# Patient Record
Sex: FEMALE | Race: OTHER | HISPANIC OR LATINO
[De-identification: names, ages, dates, MRNs, and addresses within clinical notes are randomized per-mention and may not be internally consistent; named-entity substitution may affect disease eponyms.]

---

## 2021-06-18 ENCOUNTER — APPOINTMENT (OUTPATIENT)
Dept: FAMILY MEDICINE | Facility: CLINIC | Age: 21
End: 2021-06-18
Payer: MEDICAID

## 2021-06-18 VITALS
SYSTOLIC BLOOD PRESSURE: 122 MMHG | RESPIRATION RATE: 12 BRPM | OXYGEN SATURATION: 98 % | WEIGHT: 177 LBS | HEIGHT: 67 IN | DIASTOLIC BLOOD PRESSURE: 72 MMHG | HEART RATE: 78 BPM | BODY MASS INDEX: 27.78 KG/M2 | TEMPERATURE: 97 F

## 2021-06-18 DIAGNOSIS — Z78.9 OTHER SPECIFIED HEALTH STATUS: ICD-10-CM

## 2021-06-18 DIAGNOSIS — R61 GENERALIZED HYPERHIDROSIS: ICD-10-CM

## 2021-06-18 DIAGNOSIS — L30.9 DERMATITIS, UNSPECIFIED: ICD-10-CM

## 2021-06-18 DIAGNOSIS — Z82.49 FAMILY HISTORY OF ISCHEMIC HEART DISEASE AND OTHER DISEASES OF THE CIRCULATORY SYSTEM: ICD-10-CM

## 2021-06-18 DIAGNOSIS — Z83.3 FAMILY HISTORY OF DIABETES MELLITUS: ICD-10-CM

## 2021-06-18 DIAGNOSIS — Z00.00 ENCOUNTER FOR GENERAL ADULT MEDICAL EXAMINATION W/OUT ABNORMAL FINDINGS: ICD-10-CM

## 2021-06-18 PROCEDURE — 99385 PREV VISIT NEW AGE 18-39: CPT

## 2021-06-18 PROCEDURE — 99072 ADDL SUPL MATRL&STAF TM PHE: CPT

## 2021-06-18 NOTE — HISTORY OF PRESENT ILLNESS
[FreeTextEntry1] : Pt is here to establish PCP. [de-identified] : 19 y/o F with no significant past medical hx here to establish PCP and CPE.\par needs Dermatology referral for Eczema and Hyperhidrosis.\par Pt hesitant about COVID vaccine.

## 2021-06-18 NOTE — ASSESSMENT
[FreeTextEntry1] : 19 y/o F with no significant medical condition, establishing PCP, in general good health:\par \par -Lab to be done outpt fasting.\par -dermatology referral.\par -Counseling in COVID vaccine.\par -Further recommendations with lab results.

## 2021-06-18 NOTE — HEALTH RISK ASSESSMENT
[Good] : ~his/her~  mood as  good [0-5] : 0-5 [No] : No [1 or 2 (0 pts)] : 1 or 2 (0 points) [Never (0 pts)] : Never (0 points) [Yes] : In the past 12 months have you used drugs other than those required for medical reasons? Yes [No falls in past year] : Patient reported no falls in the past year [0] : 2) Feeling down, depressed, or hopeless: Not at all (0) [No Retinopathy] : No retinopathy [HIV Test offered] : HIV Test offered [With Family] : lives with family [# of Members in Household ___] :  household currently consist of [unfilled] member(s) [Student] : student [College] : College [Single] : single [Feels Safe at Home] : Feels safe at home [Fully functional (bathing, dressing, toileting, transferring, walking, feeding)] : Fully functional (bathing, dressing, toileting, transferring, walking, feeding) [Fully functional (using the telephone, shopping, preparing meals, housekeeping, doing laundry, using] : Fully functional and needs no help or supervision to perform IADLs (using the telephone, shopping, preparing meals, housekeeping, doing laundry, using transportation, managing medications and managing finances) [Reports normal functional visual acuity (ie: able to read med bottle)] : Reports normal functional visual acuity [Smoke Detector] : smoke detector [Carbon Monoxide Detector] : carbon monoxide detector [Seat Belt] :  uses seat belt [Safety elements used in home] : safety elements used in home [Sunscreen] : uses sunscreen [With Patient/Caregiver] : With Patient/Caregiver [Name: ___] : Health Care Proxy's Name: [unfilled]  [Relationship: ___] : Relationship: [unfilled] [I will adhere to the patient's wishes as expressed in the advance directive except as noted below.] : I will adhere to the patient's wishes as expressed in the advance directive except as noted below [] : No [de-identified] : Denies [de-identified] : Dermatology  [Audit-CScore] : 0 [de-identified] : Marijuana regular 4-5/week [de-identified] : Gym [de-identified] : Rice, beans, chicken, vegetables [OCZ2Surql] : 0 [LowDoseCTScan] : n/a [EyeExamDate] : 08/20 [Sexually Active] : not sexually active [High Risk Behavior] : no high risk behavior [Reports changes in hearing] : Reports no changes in hearing [Reports changes in vision] : Reports no changes in vision [Reports changes in dental health] : Reports no changes in dental health [Guns at Home] : no guns at home [Travel to Developing Areas] : does not  travel to developing areas [TB Exposure] : is not being exposed to tuberculosis [Caregiver Concerns] : does not have caregiver concerns [MammogramDate] : denies [PapSmearDate] : denies [ColonoscopyDate] : denies [BoneDensityDate] : denies [HIVDate] : 06/21 [HepatitisCDate] : 06/21 [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

## 2021-06-28 ENCOUNTER — EMERGENCY (EMERGENCY)
Facility: HOSPITAL | Age: 21
LOS: 1 days | Discharge: HOME | End: 2021-06-28
Attending: EMERGENCY MEDICINE
Payer: COMMERCIAL

## 2021-06-28 VITALS
WEIGHT: 179.9 LBS | SYSTOLIC BLOOD PRESSURE: 107 MMHG | OXYGEN SATURATION: 99 % | TEMPERATURE: 98 F | HEIGHT: 67 IN | HEART RATE: 67 BPM | DIASTOLIC BLOOD PRESSURE: 66 MMHG | RESPIRATION RATE: 16 BRPM

## 2021-06-28 LAB
ALBUMIN SERPL ELPH-MCNC: 4.7 G/DL — SIGNIFICANT CHANGE UP (ref 3.3–5.2)
ALP SERPL-CCNC: 63 U/L — SIGNIFICANT CHANGE UP (ref 40–120)
ALT FLD-CCNC: 17 U/L — SIGNIFICANT CHANGE UP
ANION GAP SERPL CALC-SCNC: 17 MMOL/L — SIGNIFICANT CHANGE UP (ref 5–17)
AST SERPL-CCNC: 20 U/L — SIGNIFICANT CHANGE UP
BILIRUB SERPL-MCNC: 0.3 MG/DL — LOW (ref 0.4–2)
BUN SERPL-MCNC: 11.5 MG/DL — SIGNIFICANT CHANGE UP (ref 8–20)
CALCIUM SERPL-MCNC: 10 MG/DL — SIGNIFICANT CHANGE UP (ref 8.6–10.2)
CHLORIDE SERPL-SCNC: 102 MMOL/L — SIGNIFICANT CHANGE UP (ref 98–107)
CO2 SERPL-SCNC: 21 MMOL/L — LOW (ref 22–29)
CREAT SERPL-MCNC: 0.64 MG/DL — SIGNIFICANT CHANGE UP (ref 0.5–1.3)
GLUCOSE SERPL-MCNC: 120 MG/DL — HIGH (ref 70–99)
HCG SERPL-ACNC: <4 MIU/ML — SIGNIFICANT CHANGE UP
HCT VFR BLD CALC: 42.1 % — SIGNIFICANT CHANGE UP (ref 34.5–45)
HGB BLD-MCNC: 14.1 G/DL — SIGNIFICANT CHANGE UP (ref 11.5–15.5)
MCHC RBC-ENTMCNC: 29.3 PG — SIGNIFICANT CHANGE UP (ref 27–34)
MCHC RBC-ENTMCNC: 33.5 GM/DL — SIGNIFICANT CHANGE UP (ref 32–36)
MCV RBC AUTO: 87.5 FL — SIGNIFICANT CHANGE UP (ref 80–100)
PLATELET # BLD AUTO: 353 K/UL — SIGNIFICANT CHANGE UP (ref 150–400)
POTASSIUM SERPL-MCNC: 4.1 MMOL/L — SIGNIFICANT CHANGE UP (ref 3.5–5.3)
POTASSIUM SERPL-SCNC: 4.1 MMOL/L — SIGNIFICANT CHANGE UP (ref 3.5–5.3)
PROT SERPL-MCNC: 7.9 G/DL — SIGNIFICANT CHANGE UP (ref 6.6–8.7)
RBC # BLD: 4.81 M/UL — SIGNIFICANT CHANGE UP (ref 3.8–5.2)
RBC # FLD: 12.8 % — SIGNIFICANT CHANGE UP (ref 10.3–14.5)
SODIUM SERPL-SCNC: 140 MMOL/L — SIGNIFICANT CHANGE UP (ref 135–145)
WBC # BLD: 18.76 K/UL — HIGH (ref 3.8–10.5)
WBC # FLD AUTO: 18.76 K/UL — HIGH (ref 3.8–10.5)

## 2021-06-28 PROCEDURE — 96374 THER/PROPH/DIAG INJ IV PUSH: CPT

## 2021-06-28 PROCEDURE — 36415 COLL VENOUS BLD VENIPUNCTURE: CPT

## 2021-06-28 PROCEDURE — 70450 CT HEAD/BRAIN W/O DYE: CPT

## 2021-06-28 PROCEDURE — 80053 COMPREHEN METABOLIC PANEL: CPT

## 2021-06-28 PROCEDURE — 85027 COMPLETE CBC AUTOMATED: CPT

## 2021-06-28 PROCEDURE — 99284 EMERGENCY DEPT VISIT MOD MDM: CPT | Mod: 25

## 2021-06-28 PROCEDURE — 96375 TX/PRO/DX INJ NEW DRUG ADDON: CPT

## 2021-06-28 PROCEDURE — 84702 CHORIONIC GONADOTROPIN TEST: CPT

## 2021-06-28 PROCEDURE — 99284 EMERGENCY DEPT VISIT MOD MDM: CPT

## 2021-06-28 PROCEDURE — 70450 CT HEAD/BRAIN W/O DYE: CPT | Mod: 26,MA

## 2021-06-28 RX ORDER — SODIUM CHLORIDE 9 MG/ML
1000 INJECTION, SOLUTION INTRAVENOUS ONCE
Refills: 0 | Status: COMPLETED | OUTPATIENT
Start: 2021-06-28 | End: 2021-06-28

## 2021-06-28 RX ORDER — KETOROLAC TROMETHAMINE 30 MG/ML
15 SYRINGE (ML) INJECTION ONCE
Refills: 0 | Status: DISCONTINUED | OUTPATIENT
Start: 2021-06-28 | End: 2021-06-28

## 2021-06-28 RX ORDER — METOCLOPRAMIDE HCL 10 MG
10 TABLET ORAL ONCE
Refills: 0 | Status: COMPLETED | OUTPATIENT
Start: 2021-06-28 | End: 2021-06-28

## 2021-06-28 RX ORDER — MORPHINE SULFATE 50 MG/1
2 CAPSULE, EXTENDED RELEASE ORAL ONCE
Refills: 0 | Status: DISCONTINUED | OUTPATIENT
Start: 2021-06-28 | End: 2021-06-28

## 2021-06-28 RX ORDER — ONDANSETRON 8 MG/1
4 TABLET, FILM COATED ORAL ONCE
Refills: 0 | Status: COMPLETED | OUTPATIENT
Start: 2021-06-28 | End: 2021-06-28

## 2021-06-28 RX ADMIN — Medication 10 MILLIGRAM(S): at 21:43

## 2021-06-28 RX ADMIN — Medication 15 MILLIGRAM(S): at 23:57

## 2021-06-28 RX ADMIN — SODIUM CHLORIDE 1000 MILLILITER(S): 9 INJECTION, SOLUTION INTRAVENOUS at 21:46

## 2021-06-28 RX ADMIN — MORPHINE SULFATE 2 MILLIGRAM(S): 50 CAPSULE, EXTENDED RELEASE ORAL at 21:43

## 2021-06-28 RX ADMIN — Medication 1 TABLET(S): at 23:57

## 2021-06-28 RX ADMIN — ONDANSETRON 4 MILLIGRAM(S): 8 TABLET, FILM COATED ORAL at 23:57

## 2021-06-28 NOTE — ED STATDOCS - CARE PROVIDER_API CALL
Andrew Armijo)  Neurology  [address] [place] [address], [facility] [address]  [place] [address]  Phone: [phone]  Fax: [phone]  Follow Up Time:

## 2021-06-28 NOTE — ED STATDOCS - NSFOLLOWUPINSTRUCTIONS_ED_ALL_ED_FT
General Headache Without Cause    A headache is pain or discomfort felt around the head or neck area. The specific cause of a headache may not be found. There are many causes and types of headaches. A few common ones are:  •Tension headaches.      •Migraine headaches.      •Cluster headaches.      •Chronic daily headaches.        Follow these instructions at home:    Watch your condition for any changes. Let your health care provider know about them. Take these steps to help with your condition:      Managing pain                   •Take over-the-counter and prescription medicines only as told by your health care provider.      •Lie down in a dark, quiet room when you have a headache.    •If directed, put ice on your head and neck area:  •Put ice in a plastic bag.      •Place a towel between your skin and the bag.      •Leave the ice on for 20 minutes, 2–3 times per day.      •If directed, apply heat to the affected area. Use the heat source that your health care provider recommends, such as a moist heat pack or a heating pad.  •Place a towel between your skin and the heat source.      •Leave the heat on for 20–30 minutes.      •Remove the heat if your skin turns bright red. This is especially important if you are unable to feel pain, heat, or cold. You may have a greater risk of getting burned.        •Keep lights dim if bright lights bother you or make your headaches worse.      Eating and drinking     •Eat meals on a regular schedule.    •If you drink alcohol:•Limit how much you use to:   •0–1 drink a day for women.       • 0–2 drinks a day for men.         •Be aware of how much alcohol is in your drink. In the [place] one drink equals one 12 oz bottle of beer (355 mL), one 5 oz glass of wine (148 mL), or one 1½ oz glass of hard liquor (44 mL).        •Stop drinking caffeine, or decrease the amount of caffeine you drink.        General instructions    •Keep a headache journal to help find out what may trigger your headaches. For example, write down:  •What you eat and drink.      •How much sleep you get.      •Any change to your diet or medicines.        •Try massage or other relaxation techniques.      •Limit stress.      •Sit up straight, and do not tense your muscles.      • Do not use any products that contain nicotine or tobacco, such as cigarettes, e-cigarettes, and chewing tobacco. If you need help quitting, ask your health care provider.      •Exercise regularly as told by your health care provider.      •Sleep on a regular schedule. Get 7–9 hours of sleep each night, or the amount recommended by your health care provider.      •Keep all follow-up visits as told by your health care provider. This is important.        Contact a health care provider if:    •Your symptoms are not helped by medicine.      •You have a headache that is different from the usual headache.      •You have nausea or you vomit.      •You have a fever.        Get help right away if:    •Your headache becomes severe quickly.      •Your headache gets worse after moderate to intense physical activity.      •You have repeated vomiting.      •You have a stiff neck.      •You have a loss of vision.      •You have problems with speech.      •You have pain in the eye or ear.      •You have muscular weakness or loss of muscle control.      •You lose your balance or have trouble walking.      •You feel faint or pass out.      •You have confusion.      •You have a seizure.        Summary    •A headache is pain or discomfort felt around the head or neck area.      •There are many causes and types of headaches. In some cases, the cause may not be found.      •Keep a headache journal to help find out what may trigger your headaches. Watch your condition for any changes. Let your health care provider know about them.      •Contact a health care provider if you have a headache that is different from the usual headache, or if your symptoms are not helped by medicine.      •Get help right away if your headache becomes severe, you vomit, you have a loss of vision, you lose your balance, or you have a seizure.      This information is not intended to replace advice given to you by your health care provider. Make sure you discuss any questions you have with your health care provider.

## 2021-06-28 NOTE — ED STATDOCS - NS_ ATTENDINGSCRIBEDETAILS _ED_A_ED_FT
I, Keaton Beck, performed the initial face to face bedside interview with this patient regarding history of present illness, review of symptoms and relevant past medical, social and family history.  I completed an independent physical examination.  I was the initial provider who evaluated this patient. I have signed out the follow up of any pending tests (i.e. labs, radiological studies) to the ACP.  I have communicated the patient’s plan of care and disposition with the ACP.  The history, relevant review of systems, past medical and surgical history, medical decision making, and physical examination was documented by the scribe in my presence and I attest to the accuracy of the documentation.

## 2021-06-28 NOTE — ED STATDOCS - OBJECTIVE STATEMENT
21 y/o female with no PMHx, presents to the ED c/o sudden onset of headache associated with nausea and vomiting that began 5 hours ago. Reports vomiting mucous. Reports numbness of fingertips and states she was seeing flashes of lights. Notes feeling sweaty upon arrival. Denies hx of migraines. Took 3 Advil today. Denies exposure to covid-19. Denies urinary symptoms. Last ate at 3pm. NKDA.

## 2021-06-28 NOTE — ED STATDOCS - PATIENT PORTAL LINK FT
You can access the FollowMyHealth Patient Portal offered by [facility] by registering at the following website: [email or web address]. By joining [facility]’s FollowMyHealth portal, you will also be able to view your health information using other applications (apps) compatible with our system.

## 2021-06-28 NOTE — ED STATDOCS - ADDITIONAL NOTES AND INSTRUCTIONS:
PT was evaluated At [facility] ED and was found to have a condition that warranted time of to rest and heal from WORK/SCHOOL.   Nithin Blel PA-C

## 2021-06-28 NOTE — ED ADULT TRIAGE NOTE - NS ED TRIAGE AVPU SCALE
Alert-The patient is alert, awake and responds to voice. The patient is oriented to time, place, and person. The triage nurse is able to obtain subjective information. Alert & oriented; no sensory, motor or coordination deficits, normal reflexes

## 2021-06-29 RX ORDER — METOCLOPRAMIDE HCL 10 MG
1 TABLET ORAL
Qty: 9 | Refills: 0
Start: 2021-06-29 | End: 2021-07-01

## 2021-06-29 RX ORDER — IBUPROFEN 200 MG
1 TABLET ORAL
Qty: 20 | Refills: 0
Start: 2021-06-29 | End: 2021-07-03

## 2021-08-02 ENCOUNTER — APPOINTMENT (OUTPATIENT)
Dept: DERMATOLOGY | Facility: CLINIC | Age: 21
End: 2021-08-02
Payer: MEDICAID

## 2021-08-02 DIAGNOSIS — Z91.89 OTHER SPECIFIED PERSONAL RISK FACTORS, NOT ELSEWHERE CLASSIFIED: ICD-10-CM

## 2021-08-02 PROCEDURE — 99203 OFFICE O/P NEW LOW 30 MIN: CPT

## 2021-08-02 PROCEDURE — 99072 ADDL SUPL MATRL&STAF TM PHE: CPT

## 2021-08-02 RX ORDER — IBUPROFEN 600 MG/1
600 TABLET, FILM COATED ORAL
Qty: 20 | Refills: 0 | Status: ACTIVE | COMMUNITY
Start: 2021-06-29

## 2021-08-02 NOTE — HISTORY OF PRESENT ILLNESS
[FreeTextEntry1] : Excess sweating and axilla and rash on buttocks. [de-identified] : First visit for a 20-year-old  female with a three-year history of excessive sweating in the axillae.\par Hands also with increased sweating. No previous treatment.\par Patient also complains of a long history of a rash in the buttock area. Previously diagnosed as "folliculitis".\par No previous treatment.\par \par Note: Patient also has a history of eczema which is not active currently.

## 2021-08-02 NOTE — PHYSICAL EXAM
[Alert] : alert [Oriented x 3] : ~L oriented x 3 [Well Nourished] : well nourished [FreeTextEntry3] : Type II skin\par \par Patient wearing a face mask\par \par Axillae: Very moist soaking through T-shirt\par Hands: Mild increased sweating noted\par Buttocks: Mild large papules, especially on right

## 2021-10-11 ENCOUNTER — APPOINTMENT (OUTPATIENT)
Dept: DERMATOLOGY | Facility: CLINIC | Age: 21
End: 2021-10-11
Payer: MEDICAID

## 2021-10-11 DIAGNOSIS — L73.9 FOLLICULAR DISORDER, UNSPECIFIED: ICD-10-CM

## 2021-10-11 PROCEDURE — 99213 OFFICE O/P EST LOW 20 MIN: CPT

## 2021-10-11 PROCEDURE — 99072 ADDL SUPL MATRL&STAF TM PHE: CPT

## 2021-10-11 RX ORDER — BENZOYL PEROXIDE 100 MG/ML
10 LIQUID TOPICAL
Qty: 1 | Refills: 5 | Status: ACTIVE | COMMUNITY
Start: 2021-08-02 | End: 1900-01-01

## 2021-10-11 NOTE — ASSESSMENT
[FreeTextEntry1] : Hyperhidrosis: Improved\par Folliculitis on the buttocks: Improved\par Keratosis pilaris on back\par Incipient acne vulgaris on face

## 2021-10-11 NOTE — PHYSICAL EXAM
[Alert] : alert [Oriented x 3] : ~L oriented x 3 [Well Nourished] : well nourished [FreeTextEntry3] : Face: Mild papules, especially left cheek\par Upper back: Multiple 1 mm erythematous follicular papules present, especially along the spinal column area\par Back: Mild to moderate small papules

## 2021-10-11 NOTE — HISTORY OF PRESENT ILLNESS
[FreeTextEntry1] : Excess sweating and rash on buttocks [de-identified] : Followup visit for a 20-year-old  female first seen by me on August 2, 2021, with marked hyperhidrosis in the axillae and hands. Treated with glycopyrrolate 1mg\par PO twice a with significant improvement\par Patient also has folliculitis on the buttocks. This was treated with 1% clindamycin solution b.i.d. and\par 10% benzoyl peroxide wash in the shower.\par Patient also complains of "keratosis pilaris" on the back\par \par Patient also complains of acne on the face

## 2021-10-12 ENCOUNTER — NON-APPOINTMENT (OUTPATIENT)
Age: 21
End: 2021-10-12

## 2021-10-12 RX ORDER — HYDROCORTISONE 1 %
12 CREAM (GRAM) TOPICAL
Qty: 1 | Refills: 3 | Status: ACTIVE | COMMUNITY
Start: 2021-10-11 | End: 1900-01-01

## 2022-01-13 ENCOUNTER — APPOINTMENT (OUTPATIENT)
Dept: DERMATOLOGY | Facility: CLINIC | Age: 22
End: 2022-01-13
Payer: MEDICAID

## 2022-01-13 DIAGNOSIS — L30.8 OTHER SPECIFIED DERMATITIS: ICD-10-CM

## 2022-01-13 PROCEDURE — 99072 ADDL SUPL MATRL&STAF TM PHE: CPT

## 2022-01-13 PROCEDURE — 99213 OFFICE O/P EST LOW 20 MIN: CPT

## 2022-01-13 RX ORDER — CLINDAMYCIN PHOSPHATE 10 MG/ML
1 LOTION TOPICAL
Qty: 1 | Refills: 3 | Status: ACTIVE | COMMUNITY
Start: 2021-10-11 | End: 1900-01-01

## 2022-01-13 RX ORDER — HYDROCORTISONE 25 MG/G
2.5 OINTMENT TOPICAL
Qty: 1 | Refills: 1 | Status: ACTIVE | COMMUNITY
Start: 2022-01-13 | End: 1900-01-01

## 2022-01-13 RX ORDER — TRETINOIN 0.5 MG/G
0.05 CREAM TOPICAL
Qty: 1 | Refills: 2 | Status: ACTIVE | COMMUNITY
Start: 2022-01-13 | End: 1900-01-01

## 2022-01-13 NOTE — PHYSICAL EXAM
[Alert] : alert [Oriented x 3] : ~L oriented x 3 [Well Nourished] : well nourished [FreeTextEntry3] : Face: Symmetric horizontal urticarial pink plaques present on the upper cheeks\par Rare very small papules or pustules\par Mild comedones\par Upper mid back: Multiple erythematous follicular papules

## 2022-01-13 NOTE — ASSESSMENT
[FreeTextEntry1] : Hyperhidrosis: Under very good control\par Acne on face: Under very good control \par Keratosis pilaris on back: Persistent\par ? Irritant dermatitis from facemask on upper cheeks

## 2022-01-13 NOTE — HISTORY OF PRESENT ILLNESS
[FreeTextEntry1] : Hyperhidrosis and acne [de-identified] : Followup visit for 21-year-old   female last seen by me on October 11, 2021, with marked hyperhidrosis of the axillae and hands.  Treated with glycopyrrolate 1 mg p.o. b.i.d. with good results.  Currently on 1 mg a day.\par Patient also had acne vulgaris on the face and folliculitis on the back and buttocks. This was treated with:\par Start 1% clindamycin lotion to affected areas b.i.d.\par Continue 10% benzoyl peroxide wash in shower\par \par Patient also had keratosis pilaris on the back\par This was treated with 12% ammonium lactate lotion\par \par Patient complains of "burning" in the left upper cheek

## 2022-06-01 ENCOUNTER — APPOINTMENT (OUTPATIENT)
Dept: NEUROLOGY | Facility: CLINIC | Age: 22
End: 2022-06-01
Payer: MEDICAID

## 2022-06-01 ENCOUNTER — NON-APPOINTMENT (OUTPATIENT)
Age: 22
End: 2022-06-01

## 2022-06-01 VITALS
TEMPERATURE: 98.4 F | HEIGHT: 67 IN | HEART RATE: 82 BPM | BODY MASS INDEX: 27.47 KG/M2 | OXYGEN SATURATION: 97 % | SYSTOLIC BLOOD PRESSURE: 130 MMHG | WEIGHT: 175 LBS | DIASTOLIC BLOOD PRESSURE: 79 MMHG

## 2022-06-01 DIAGNOSIS — Z82.0 FAMILY HISTORY OF EPILEPSY AND OTHER DISEASES OF THE NERVOUS SYSTEM: ICD-10-CM

## 2022-06-01 PROCEDURE — 99203 OFFICE O/P NEW LOW 30 MIN: CPT

## 2022-06-01 RX ORDER — RIMEGEPANT SULFATE 75 MG/75MG
75 TABLET, ORALLY DISINTEGRATING ORAL
Refills: 0 | Status: ACTIVE | COMMUNITY

## 2022-06-01 RX ORDER — BUTALBITAL, ACETAMINOPHEN AND CAFFEINE 300; 50; 40 MG/1; MG/1; MG/1
50-300-40 CAPSULE ORAL
Qty: 20 | Refills: 0 | Status: ACTIVE | COMMUNITY
Start: 2022-03-16

## 2022-06-01 NOTE — DISCUSSION/SUMMARY
[FreeTextEntry1] : Ms. Goins is a 21 year-old woman with PMH migraines who presented to the office today for evaluation of a bump on the right side of her head and migraines. CTH performed at [facility] in June 2021 was unremarkable. Perform MRI head to further evaluate patient's symptoms of head pain and small right-sided lump on head. Continue Nurtec 75mg PRN for migraines. Follow-up with me in one month. All of her questions and concerns were addressed.

## 2022-06-01 NOTE — HISTORY OF PRESENT ILLNESS
[FreeTextEntry1] : Ms. Goins is a 21 year-old woman with PMH migraines who presents to the office today to obtain a second opinion for painful bump on right side of head. She reports emergence of bump about a year ago and states that it has enlarged over the past few months. Palpation of or laying on the bump causes a throbbing type pain. She reports history of migraines that occur infrequently (last migraine in December/January) and cause a severe, throbbing pain throughout her entire head with associated light sensitivity, sound sensitivity and nausea. She has taken Fioricet with relief of symptoms and was recently prescribed Nurtec but has not needed to take it as of yet. CTH performed at [facility] in June 2021 was unremarkable. She has never had an MRI head performed. She reports occasional tingling sensation in palms bilateral hands at night. She denies any other new or concerning neurological symptoms. \par

## 2022-06-01 NOTE — PHYSICAL EXAM
[FreeTextEntry1] : GENERAL PHYSICAL EXAM:\par GEN: no distress, normal affect\par HEENT: NCAT, OP clear\par EYES: sclera white, conjunctiva clear, no nystagmus\par NECK: supple\par CV: normal rhythm\par PULM: no respiratory distress, normal rhythm and effort\par EXT: no edema, no cyanosis\par MSK: muscle tone and strength normal\par SKIN: warm, dry, no rash or lesion on exposed skin, firm, tender small pea sized bump on lower parietal aspect of right side head\par \par NEUROLOGICAL EXAM:\par Mental Status\par Orientation: alert and oriented to person, place, time, and situation\par Language: clear and fluent, intact comprehension and repetition\par \par Cranial Nerves\par II: visual fields full to confrontation \par III, IV, VI: PERRL, EOMI\par V, VII: facial sensation and movement intact and symmetric \par VIII: hearing intact \par IX, X: uvula midline, soft palate elevates normally \par XI: BL shoulder shrug intact \par XII: tongue midline\par \par Motor\par Shoulder abd: 5 (R), 5 (L)\par EF/EE: 5 (R), 5 (L)\par hand : 5 (R), 5 (L)\par HF/HE: 5 (R), 5 (L)\par KF/KE: 5 (R), 5 (L)\par DF/PF: 5 (R), 5 (L) \par Tone and bulk are normal in upper and lower limbs\par No pronator drift\par \par Sensation\par Intact to light touch in all 4 EXTs\par \par Reflex\par 2+ in BL biceps, brachioradialis, patella\par \par Coordination\par Normal FTN bilaterally\par Dysdiadochokinesia not present. \par Able to perform rapid, alternating movements\par \par Gait\par Normal stance, stride, and pivot turn\par Tandem walk intact

## 2022-06-01 NOTE — REVIEW OF SYSTEMS
[Numbness] : numbness [Tingling] : tingling [Migraine Headache] : migraine headaches [Confused or Disoriented] : no confusion [Memory Lapses or Loss] : no memory loss [Decr. Concentrating Ability] : no decrease in concentrating ability [Difficulty with Language] : no ~M difficulty with language [Facial Weakness] : no facial weakness [Arm Weakness] : no arm weakness [Hand Weakness] : no hand weakness [Leg Weakness] : no leg weakness [Poor Coordination] : good coordination [Seizures] : no convulsions [Dizziness] : no dizziness [Cluster Headache] : no cluster headache [Difficulty Walking] : no difficulty walking [Frequent Falls] : not falling [Anxiety] : no anxiety [Depression] : no depression [Eye Pain] : no eye pain [Eyesight Problems] : no eyesight problems [Earache] : no earache [Loss Of Hearing] : no hearing loss [Chest Pain] : no chest pain [Palpitations] : no palpitations [Shortness Of Breath] : no shortness of breath [Cough] : no cough [de-identified] : Occasional numbness/tingling on palms of bilateral hands at night

## 2022-06-13 ENCOUNTER — APPOINTMENT (OUTPATIENT)
Dept: DERMATOLOGY | Facility: CLINIC | Age: 22
End: 2022-06-13
Payer: MEDICAID

## 2022-06-13 DIAGNOSIS — L74.513 PRIMARY FOCAL HYPERHIDROSIS, PALMS: ICD-10-CM

## 2022-06-13 DIAGNOSIS — L85.8 OTHER SPECIFIED EPIDERMAL THICKENING: ICD-10-CM

## 2022-06-13 DIAGNOSIS — L74.512 PRIMARY FOCAL HYPERHIDROSIS, PALMS: ICD-10-CM

## 2022-06-13 DIAGNOSIS — L70.0 ACNE VULGARIS: ICD-10-CM

## 2022-06-13 DIAGNOSIS — B36.0 PITYRIASIS VERSICOLOR: ICD-10-CM

## 2022-06-13 PROCEDURE — 99213 OFFICE O/P EST LOW 20 MIN: CPT

## 2022-06-13 RX ORDER — CLINDAMYCIN PHOSPHATE 10 MG/ML
1 SOLUTION TOPICAL
Qty: 1 | Refills: 5 | Status: ACTIVE | COMMUNITY
Start: 2021-08-02 | End: 1900-01-01

## 2022-06-13 NOTE — PHYSICAL EXAM
[Alert] : alert [Oriented x 3] : ~L oriented x 3 [Well Nourished] : well nourished [FreeTextEntry3] : Face: Essentially no papules\par Mild comedones-especially\par Back: Multiple dull follicular erythematous papules present, especially on the upper midback\par \par right lower neck: Small brown slightly scaly patch with fewvery small tan scaly macules present Southeast of it\par KOH-positive

## 2022-06-13 NOTE — ASSESSMENT
[FreeTextEntry1] : Hyperhidrosis: Under good control\par Acne vulgaris on face: Under good control with current with comedonal component on chin\par Keratosis pilaris on back  -  persistent\par Tinea versicolor on right neck

## 2022-06-13 NOTE — HISTORY OF PRESENT ILLNESS
[FreeTextEntry1] : Hyperhidrosis and acne [de-identified] : Follow-up visit for 21-year-old  female last seen by me on January 13, 2022, with a variety of issues.\par \par Marked hyperhidrosis in the axillae and hands.  Currently treated with:\par Glycopyrrolate 1 mg p.o. once a day with good results\par \par Acne vulgaris on the face and folliculitis on the back and buttocks.  Currently treated with:\par Continue 1% clindamycin lotion to face twice daily - currently using 1% clindamycin solution\par Continue 10% benzyl peroxide wash and shower\par \par Return 6 months also complains of a rash on the right neck.  Gets larger during the summer.\par \par Keratosis pilaris on the back.  Treatment modified at the last visit to:\par Continue 12% ammonium lactate lotion to back in a.m.\par Start tretinoin cream 0.05% apply sparingly to back at night- stopped using this due to "burning" \par \par Patient also complained of "burning" diagnosed as a possible irritant reaction from the facemask.  This was treated with:\par Start 2-1/2% hydrocortisone ointment to face once or twice a day as needed.  This has resolved.

## 2022-07-05 ENCOUNTER — APPOINTMENT (OUTPATIENT)
Dept: NEUROLOGY | Facility: CLINIC | Age: 22
End: 2022-07-05

## 2022-07-05 VITALS
SYSTOLIC BLOOD PRESSURE: 112 MMHG | HEIGHT: 67 IN | TEMPERATURE: 98.5 F | WEIGHT: 175 LBS | DIASTOLIC BLOOD PRESSURE: 72 MMHG | OXYGEN SATURATION: 97 % | BODY MASS INDEX: 27.47 KG/M2 | HEART RATE: 66 BPM

## 2022-07-05 DIAGNOSIS — G43.909 MIGRAINE, UNSPECIFIED, NOT INTRACTABLE, W/OUT STATUS MIGRAINOSUS: ICD-10-CM

## 2022-07-05 PROCEDURE — 99213 OFFICE O/P EST LOW 20 MIN: CPT

## 2022-07-05 NOTE — HISTORY OF PRESENT ILLNESS
[FreeTextEntry1] : INTERIM HISTORY: Since her last visit, Ms. Goins had MRI brain performed, which was unremarkable. Bump on right side of head still present and painful with pressure or palpation. She denies migraines, headaches or any other new or concerning neurological symptoms. \par \par INITIAL OFFICE VISIT 6/1/22: Ms. Goins is a 21 year-old woman with PMH migraines who presents to the office today to obtain a second opinion for painful bump on right side of head. She reports emergence of bump about a year ago and states that it has enlarged over the past few months. Palpation of or laying on the bump causes a throbbing type pain. She reports history of migraines that occur infrequently (last migraine in December/January) and cause a severe, throbbing pain throughout her entire head with associated light sensitivity, sound sensitivity and nausea. She has taken Fioricet with relief of symptoms and was recently prescribed Nurtec but has not needed to take it as of yet. CTH performed at [facility] in June 2021 was unremarkable. She has never had an MRI head performed. She reports occasional tingling sensation in palms bilateral hands at night. She denies any other new or concerning neurological symptoms. \par

## 2022-07-05 NOTE — REVIEW OF SYSTEMS
[Numbness] : numbness [Tingling] : tingling [Migraine Headache] : migraine headaches [Confused or Disoriented] : no confusion [Memory Lapses or Loss] : no memory loss [Decr. Concentrating Ability] : no decrease in concentrating ability [Difficulty with Language] : no ~M difficulty with language [Facial Weakness] : no facial weakness [Arm Weakness] : no arm weakness [Hand Weakness] : no hand weakness [Leg Weakness] : no leg weakness [Poor Coordination] : good coordination [Seizures] : no convulsions [Dizziness] : no dizziness [Cluster Headache] : no cluster headache [Difficulty Walking] : no difficulty walking [Frequent Falls] : not falling [Anxiety] : no anxiety [Depression] : no depression [Eye Pain] : no eye pain [Eyesight Problems] : no eyesight problems [Earache] : no earache [Loss Of Hearing] : no hearing loss [Chest Pain] : no chest pain [Palpitations] : no palpitations [Shortness Of Breath] : no shortness of breath [Cough] : no cough [de-identified] : Occasional numbness/tingling on palms of bilateral hands at night

## 2022-07-05 NOTE — DISCUSSION/SUMMARY
[FreeTextEntry1] : Ms. Goins is a 21 year-old woman with PMH migraines who presented to the office today for evaluation of a bump on the right side of her head and migraines. CTH and MRI head are unremarkable. Follow-up with dermatology regarding bump on scalp, which is presumably either a sebaceous cyst or dermoid cyst. Continue Nurtec 75mg PRN for migraines. Follow-up with me PRN. All of her questions and concerns were addressed.

## 2022-08-02 ENCOUNTER — APPOINTMENT (OUTPATIENT)
Dept: DERMATOLOGY | Facility: CLINIC | Age: 22
End: 2022-08-02

## 2022-08-02 DIAGNOSIS — L72.11 PILAR CYST: ICD-10-CM

## 2022-08-02 PROCEDURE — 99212 OFFICE O/P EST SF 10 MIN: CPT

## 2022-08-02 RX ORDER — ERGOCALCIFEROL 1.25 MG/1
1.25 MG CAPSULE, LIQUID FILLED ORAL
Qty: 4 | Refills: 0 | Status: DISCONTINUED | COMMUNITY
Start: 2022-07-22

## 2022-08-02 NOTE — HISTORY OF PRESENT ILLNESS
[FreeTextEntry1] : Lump on scalp [de-identified] : Followup visit for 21-year-old  female last seen by me on June 13, 2022, presenting today a one year history of a tender lump on the right occipital scalp area. Had an MRI done by neurologist which did not reveal anything.

## 2022-08-02 NOTE — PHYSICAL EXAM
[Alert] : alert [Oriented x 3] : ~L oriented x 3 [Well Nourished] : well nourished [FreeTextEntry3] : Patient wearing a face mask \par \par Right lower parietal scalp: 6 x 6 mm skin colored freely movable palpable nodule

## 2022-08-08 ENCOUNTER — APPOINTMENT (OUTPATIENT)
Dept: DERMATOLOGY | Facility: CLINIC | Age: 22
End: 2022-08-08

## 2022-08-08 DIAGNOSIS — L72.3 SEBACEOUS CYST: ICD-10-CM

## 2022-08-08 PROCEDURE — 99214 OFFICE O/P EST MOD 30 MIN: CPT

## 2022-08-08 NOTE — ASSESSMENT
[FreeTextEntry1] : Pilar cyst;  painful at times; \par Schedule for excision; \par The risks and benefits of the procedure, were explained at length including pain; scar, bleeding; recurrence of lesion(s); need for wound care;\par  \par f/u surgery;

## 2022-08-08 NOTE — PHYSICAL EXAM
[FreeTextEntry3] : Right postauricular scalp; \par Mobile subcutaneous nodule with normal overlying skin and a connecting pore \par \par

## 2022-08-29 ENCOUNTER — APPOINTMENT (OUTPATIENT)
Dept: DERMATOLOGY | Facility: CLINIC | Age: 22
End: 2022-08-29

## 2022-08-29 DIAGNOSIS — D48.5 NEOPLASM OF UNCERTAIN BEHAVIOR OF SKIN: ICD-10-CM

## 2022-08-29 PROCEDURE — 11422 EXC H-F-NK-SP B9+MARG 1.1-2: CPT

## 2022-08-29 PROCEDURE — 12032 INTMD RPR S/A/T/EXT 2.6-7.5: CPT

## 2022-09-06 ENCOUNTER — APPOINTMENT (OUTPATIENT)
Dept: DERMATOLOGY | Facility: CLINIC | Age: 22
End: 2022-09-06

## 2022-09-06 PROCEDURE — 99024 POSTOP FOLLOW-UP VISIT: CPT

## 2022-09-13 LAB — CORE LAB BIOPSY: NORMAL

## 2022-10-13 NOTE — ED STATDOCS - PROGRESS NOTE DETAILS
Nithin Cuevas: Pt seen by intake physician and HPI/ROS/PE/plan reviewed Confirmed and adjusted were appropriate.    PE: GEN: Awake, alert,  NAD,  EYES: PERRL, EOMI,   CARDIAC: Reg rate and rhythm, S1,S2, RRR  RESP: No distress noted. Lungs CTA bilaterally no wheeze, ronchi, rales. ABD: soft,  non-tender, no guarding. . NEURO: A&O x 3, CN II-Xii GI, MAEx 4,  5/5/ motors, = sensation, no pronator drift or ataxia.    PLAN: PT with stable VS, no acute distress, non toxic appearing, tolerating PO in the ED, PT neuro vasc intact, gradual onset of HA, non exertional HA, good response to meds, Pt to be dc home with follow up to PCP, neuro, educated about when to return to the ED if needed. PT verbalizes that he understands all instructions and results. Pt informed that ED is open and available 24/7 365 days a yr, encouraged to return to the ED if they have any change in condition, or feel the need for revaluation.
CPAP

## 2023-02-02 ENCOUNTER — NON-APPOINTMENT (OUTPATIENT)
Age: 23
End: 2023-02-02

## 2023-02-02 RX ORDER — GLYCOPYRROLATE 1 MG/1
1 TABLET ORAL
Qty: 60 | Refills: 4 | Status: ACTIVE | COMMUNITY
Start: 2021-08-02 | End: 1900-01-01

## 2023-03-07 ENCOUNTER — APPOINTMENT (OUTPATIENT)
Dept: DERMATOLOGY | Facility: CLINIC | Age: 23
End: 2023-03-07

## 2023-03-07 ENCOUNTER — NON-APPOINTMENT (OUTPATIENT)
Age: 23
End: 2023-03-07

## 2023-03-07 RX ORDER — GLYCOPYRROLATE 2 MG/1
2 TABLET ORAL DAILY
Qty: 90 | Refills: 1 | Status: ACTIVE | COMMUNITY
Start: 2023-03-07 | End: 1900-01-01

## 2023-04-24 ENCOUNTER — NON-APPOINTMENT (OUTPATIENT)
Age: 23
End: 2023-04-24

## 2023-06-07 ENCOUNTER — EMERGENCY (EMERGENCY)
Facility: HOSPITAL | Age: 23
LOS: 1 days | Discharge: HOME | End: 2023-06-07
Attending: EMERGENCY MEDICINE
Payer: COMMERCIAL

## 2023-06-07 VITALS
HEART RATE: 83 BPM | WEIGHT: 165.79 LBS | OXYGEN SATURATION: 98 % | SYSTOLIC BLOOD PRESSURE: 121 MMHG | RESPIRATION RATE: 19 BRPM | TEMPERATURE: 99 F | DIASTOLIC BLOOD PRESSURE: 78 MMHG

## 2023-06-07 PROCEDURE — 10060 I&D ABSCESS SIMPLE/SINGLE: CPT

## 2023-06-07 PROCEDURE — 82962 GLUCOSE BLOOD TEST: CPT

## 2023-06-07 PROCEDURE — 99284 EMERGENCY DEPT VISIT MOD MDM: CPT | Mod: 25

## 2023-06-07 PROCEDURE — 99282 EMERGENCY DEPT VISIT SF MDM: CPT

## 2023-06-07 RX ORDER — CEPHALEXIN 500 MG
1 CAPSULE ORAL
Qty: 28 | Refills: 0
Start: 2023-06-07 | End: 2023-06-13

## 2023-06-07 NOTE — ED ADULT NURSE NOTE - OBJECTIVE STATEMENT
pt a&ox3, vss, c/o abscess on L buttock x2 weeks, pt denies fevers or bleeding from site. respirations even and unlabored. POC discussed w. patient will continue to reassess

## 2023-06-07 NOTE — ED PROVIDER NOTE - PATIENT PORTAL LINK FT
You can access the FollowMyHealth Patient Portal offered by [facility] by registering at the following website: [email or web address]. By joining [facility]’s FollowMyHealth portal, you will also be able to view your health information using other applications (apps) compatible with our system.

## 2023-06-07 NOTE — ED ADULT NURSE NOTE - NSFALLUNIVINTERV_ED_ALL_ED
Bed/Stretcher in lowest position, wheels locked, appropriate side rails in place/Call bell, personal items and telephone in reach/Instruct patient to call for assistance before getting out of bed/chair/stretcher/Non-slip footwear applied when patient is off stretcher/[place] to call system/Physically safe environment - no spills, clutter or unnecessary equipment/Purposeful proactive rounding/Room/bathroom lighting operational, light cord in reach

## 2023-06-07 NOTE — ED PROVIDER NOTE - NS ED ATTENDING STATEMENT MOD
This was a shared visit with the ASHA. I reviewed and verified the documentation and independently performed the documented:

## 2023-06-07 NOTE — ED PROCEDURE NOTE - NS ED ATTENDING STATEMENT MOD
Patients glu high overnight, but did not get lantus yesterday as was NPO for procedures and had some hypoglycemia.  Patient to receive AM lantus today + meal insulin + sliding scale insulin which should help correct hyperglycemia.   This was a shared visit with the ASHA. I reviewed and verified the documentation and independently performed the documented:

## 2023-06-07 NOTE — ED ADULT TRIAGE NOTE - CHIEF COMPLAINT QUOTE
Pt c/o abscess to right inner buttocks x 2 weeks.  Pt denies drainage, fever, chills, nausea, vomiting.

## 2023-06-07 NOTE — ED PROCEDURE NOTE - ATTENDING APP SHARED VISIT CONTRIBUTION OF CARE
I, the attending physician, was present with the PA/NP/resident for the key portions of the procedure.  Drea Elder, DO

## 2023-06-08 ENCOUNTER — APPOINTMENT (OUTPATIENT)
Dept: DERMATOLOGY | Facility: CLINIC | Age: 23
End: 2023-06-08

## 2023-07-17 ENCOUNTER — APPOINTMENT (OUTPATIENT)
Dept: SURGERY | Facility: CLINIC | Age: 23
End: 2023-07-17
Payer: MEDICAID

## 2023-07-17 ENCOUNTER — NON-APPOINTMENT (OUTPATIENT)
Age: 23
End: 2023-07-17

## 2023-07-17 VITALS
OXYGEN SATURATION: 97 % | BODY MASS INDEX: 27.47 KG/M2 | SYSTOLIC BLOOD PRESSURE: 92 MMHG | HEART RATE: 72 BPM | HEIGHT: 67 IN | WEIGHT: 175 LBS | DIASTOLIC BLOOD PRESSURE: 65 MMHG

## 2023-07-17 DIAGNOSIS — K60.3 ANAL FISTULA: ICD-10-CM

## 2023-07-17 PROCEDURE — 99204 OFFICE O/P NEW MOD 45 MIN: CPT

## 2023-07-17 NOTE — REVIEW OF SYSTEMS
[Fever] : no fever [Chills] : no chills [Eye Pain] : no eye pain [Nosebleeds] : no nosebleeds [Chest Pain] : no chest pain [Shortness Of Breath] : no shortness of breath [Convulsions] : no convulsions [Easy Bleeding] : no tendency for easy bleeding [Easy Bruising] : no tendency for easy bruising

## 2023-07-17 NOTE — CONSULT LETTER
[Dear  ___] : Dear  [unfilled], [Consult Letter:] : I had the pleasure of evaluating your patient, [unfilled]. [Please see my note below.] : Please see my note below. [Consult Closing:] : Thank you very much for allowing me to participate in the care of this patient.  If you have any questions, please do not hesitate to contact me. [Sincerely,] : Sincerely, [FreeTextEntry3] : Wm Brendan MEDEL

## 2023-07-17 NOTE — PLAN
[FreeTextEntry1] : Cont weekly sitz baths. PO abc if bump returns. Would use Cipro/ Flagyl for recurrence.\par F/U as needed.\par total time > 47 minutes

## 2023-07-17 NOTE — PHYSICAL EXAM
[JVD] : no jugular venous distention  [Normal Breath Sounds] : Normal breath sounds [Normal Heart Sounds] : normal heart sounds [Abdomen Tenderness] : ~T ~M No abdominal tenderness [No Rash or Lesion] : No rash or lesion [Alert] : alert [Oriented to Person] : oriented to person [Oriented to Place] : oriented to place [Oriented to Time] : oriented to time [Calm] : calm [de-identified] : NAD [de-identified] : NC/AT PER [de-identified] : marcia [de-identified] : Small bump R lateral - no skin erosion [de-identified] : moves all 4 extr 5/5

## 2023-11-15 ENCOUNTER — APPOINTMENT (OUTPATIENT)
Dept: DERMATOLOGY | Facility: CLINIC | Age: 23
End: 2023-11-15
Payer: COMMERCIAL

## 2023-11-15 DIAGNOSIS — L74.510 PRIMARY FOCAL HYPERHIDROSIS, AXILLA: ICD-10-CM

## 2023-11-15 PROCEDURE — 99214 OFFICE O/P EST MOD 30 MIN: CPT

## 2023-11-15 RX ORDER — GLYCOPYRROLATE 1 MG/1
1 TABLET ORAL DAILY
Qty: 1 | Refills: 3 | Status: ACTIVE | COMMUNITY
Start: 2023-11-15 | End: 1900-01-01

## 2023-11-15 RX ORDER — FLUCONAZOLE 200 MG/1
200 TABLET ORAL
Qty: 4 | Refills: 3 | Status: DISCONTINUED | COMMUNITY
Start: 2022-06-13 | End: 2023-11-15

## 2023-12-16 ENCOUNTER — EMERGENCY (EMERGENCY)
Facility: HOSPITAL | Age: 23
LOS: 1 days | Discharge: HOME | End: 2023-12-16
Attending: STUDENT IN AN ORGANIZED HEALTH CARE EDUCATION/TRAINING PROGRAM
Payer: COMMERCIAL

## 2023-12-16 VITALS
TEMPERATURE: 98 F | HEART RATE: 69 BPM | RESPIRATION RATE: 20 BRPM | OXYGEN SATURATION: 99 % | WEIGHT: 171.08 LBS | HEIGHT: 68 IN | DIASTOLIC BLOOD PRESSURE: 75 MMHG | SYSTOLIC BLOOD PRESSURE: 109 MMHG

## 2023-12-16 PROCEDURE — 99284 EMERGENCY DEPT VISIT MOD MDM: CPT

## 2023-12-16 PROCEDURE — 99283 EMERGENCY DEPT VISIT LOW MDM: CPT | Mod: 25

## 2023-12-16 PROCEDURE — 10060 I&D ABSCESS SIMPLE/SINGLE: CPT

## 2023-12-16 RX ADMIN — Medication 300 MILLIGRAM(S): at 21:53

## 2023-12-16 NOTE — ED ADULT TRIAGE NOTE - HISTORY OF COVID-19 VACCINATION
unable to perform/unsafe at this time due to decreased functional status and decreased cognition
Vaccine status unknown

## 2023-12-16 NOTE — ED PROVIDER NOTE - PATIENT PORTAL LINK FT
You can access the FollowMyHealth Patient Portal offered by [facility] by registering at the following website: [email or web address]. By joining [employer]’s FollowMyHealth portal, you will also be able to view your health information using other applications (apps) compatible with our system. You can access the FollowMyHealth Patient Portal offered by [facility] by registering at the following website: [email or web address]. By joining [employer]’s FollowMyHealth portal, you will also be able to view your health information using other applications (apps) compatible with our system.

## 2023-12-16 NOTE — ED PROVIDER NOTE - CLINICAL SUMMARY MEDICAL DECISION MAKING FREE TEXT BOX
23F with abscess to buttock. 23F with abscess to inner right buttock. I&D performed. Pt tolerated well. started on abx. to fu outpt. given return precautions. 23F with abscess to inner right buttock. I&D performed. Pt tolerated well. started on abx. to fu outpt. given return precautions.    no indication for imaging at this time, no suspicion for deeper abscess involvement

## 2023-12-16 NOTE — ED ADULT TRIAGE NOTE - CHIEF COMPLAINT QUOTE
Patient presents to ED with c/o rectal pain that started 2 weeks ago but has gotten progressively worse causing difficulty sitting.  Patient with h/o same back in June which patient states abscess was drained.  No meds PTA  Denies rectal bleeding at this time.

## 2023-12-16 NOTE — ED PROVIDER NOTE - ATTENDING APP SHARED VISIT CONTRIBUTION OF CARE
I, Imelda Rossi MD, have reviewed the ACP's documentation and my findings have been added to this documentation as indicated.

## 2023-12-16 NOTE — ED PROCEDURE NOTE - CPROC ED TIME OUT STATEMENT1
“Patient's name, , procedure and correct site were confirmed during the [place] Timeout.” “Patient's name, , procedure and correct site were confirmed during the [place] Timeout.”

## 2023-12-16 NOTE — ED PROVIDER NOTE - OBJECTIVE STATEMENT
23F presenting with pain at the rectum similar to when she had an abscess in June. Pt states for the past week the pain and bump has been increasing.   Denies fever, drainage. 23F presenting with pain at the rectum similar to when she had an abscess in June. Pt states for the past week the pain and bump has been increasing.   Denies fever, drainage. no cuurent abx use

## 2023-12-16 NOTE — ED PROVIDER NOTE - PHYSICAL EXAMINATION
spouse PHYSICAL EXAM:   General: well-appearing, appears stated age, not in extremis   Cardiovascular: regular rate   Respiratory: nonlabored respirations  Rectal: performed with chaperone PA [place]: 1-2 cm area of tenderness and fluctuance in perineal region near right gluteal cleft. no perianal tenderness.   Neuro: awake alert interactive moving all extremities  Psychiatric: appropriate mood and affect.   Skin: as above  -Imelda Rossi MD Attending Physician

## 2023-12-16 NOTE — ED PROVIDER NOTE - NSFOLLOWUPINSTRUCTIONS_ED_ALL_ED_FT
Complete all antibiotics.     Abscess    An abscess is an infected area that contains a collection of pus and debris. It can occur in almost any part of the body and occurs when the tissue gets infection. Symptoms include a painful mass that is red, warm, tender that might break open and HAVE drainage. If your health care provider gave you antibiotics make sure to take the full course and do not stop even if feeling better.     SEEK IMMEDIATE MEDICAL CARE IF YOU HAVE ANY OF THE FOLLOWING SYMPTOMS: chills, fever, muscle aches, or red streaking from the area.

## 2024-03-24 NOTE — HISTORY OF PRESENT ILLNESS
[de-identified] : R perianal infection. Drained at [facility]. Was given Keflex. Here for f/u. No skin drainage. [phone]

## 2024-04-25 ENCOUNTER — NON-APPOINTMENT (OUTPATIENT)
Age: 24
End: 2024-04-25

## 2024-08-12 ENCOUNTER — NON-APPOINTMENT (OUTPATIENT)
Age: 24
End: 2024-08-12

## 2024-10-06 ENCOUNTER — EMERGENCY (EMERGENCY)
Facility: HOSPITAL | Age: 24
LOS: 1 days | Discharge: HOME | End: 2024-10-06
Attending: EMERGENCY MEDICINE
Payer: COMMERCIAL

## 2024-10-06 VITALS
HEART RATE: 85 BPM | WEIGHT: 187.39 LBS | TEMPERATURE: 99 F | RESPIRATION RATE: 18 BRPM | DIASTOLIC BLOOD PRESSURE: 67 MMHG | OXYGEN SATURATION: 98 % | SYSTOLIC BLOOD PRESSURE: 107 MMHG

## 2024-10-06 LAB
ALBUMIN SERPL ELPH-MCNC: 4.3 G/DL — SIGNIFICANT CHANGE UP (ref 3.3–5.2)
ALP SERPL-CCNC: 67 U/L — SIGNIFICANT CHANGE UP (ref 40–120)
ALT FLD-CCNC: 24 U/L — SIGNIFICANT CHANGE UP
ANION GAP SERPL CALC-SCNC: 14 MMOL/L — SIGNIFICANT CHANGE UP (ref 5–17)
APPEARANCE UR: ABNORMAL
AST SERPL-CCNC: 23 U/L — SIGNIFICANT CHANGE UP
BACTERIA # UR AUTO: ABNORMAL /HPF
BASOPHILS # BLD AUTO: 0.03 K/UL — SIGNIFICANT CHANGE UP (ref 0–0.2)
BASOPHILS NFR BLD AUTO: 0.5 % — SIGNIFICANT CHANGE UP (ref 0–2)
BILIRUB SERPL-MCNC: 0.3 MG/DL — LOW (ref 0.4–2)
BILIRUB UR-MCNC: NEGATIVE — SIGNIFICANT CHANGE UP
BUN SERPL-MCNC: 10.6 MG/DL — SIGNIFICANT CHANGE UP (ref 8–20)
CALCIUM SERPL-MCNC: 9.1 MG/DL — SIGNIFICANT CHANGE UP (ref 8.4–10.5)
CAST: 0 /LPF — SIGNIFICANT CHANGE UP (ref 0–4)
CHLORIDE SERPL-SCNC: 99 MMOL/L — SIGNIFICANT CHANGE UP (ref 96–108)
CO2 SERPL-SCNC: 23 MMOL/L — SIGNIFICANT CHANGE UP (ref 22–29)
COLOR SPEC: YELLOW — SIGNIFICANT CHANGE UP
CREAT SERPL-MCNC: 0.65 MG/DL — SIGNIFICANT CHANGE UP (ref 0.5–1.3)
CRP SERPL-MCNC: 15 MG/L — HIGH
DIFF PNL FLD: ABNORMAL
EGFR: 126 ML/MIN/1.73M2 — SIGNIFICANT CHANGE UP
EOSINOPHIL # BLD AUTO: 0.05 K/UL — SIGNIFICANT CHANGE UP (ref 0–0.5)
EOSINOPHIL NFR BLD AUTO: 0.9 % — SIGNIFICANT CHANGE UP (ref 0–6)
FLUAV AG NPH QL: SIGNIFICANT CHANGE UP
FLUBV AG NPH QL: SIGNIFICANT CHANGE UP
GLUCOSE SERPL-MCNC: 95 MG/DL — SIGNIFICANT CHANGE UP (ref 70–99)
GLUCOSE UR QL: NEGATIVE MG/DL — SIGNIFICANT CHANGE UP
HCG SERPL-ACNC: <4 MIU/ML — SIGNIFICANT CHANGE UP
HCT VFR BLD CALC: 41.4 % — SIGNIFICANT CHANGE UP (ref 34.5–45)
HGB BLD-MCNC: 13.8 G/DL — SIGNIFICANT CHANGE UP (ref 11.5–15.5)
IMM GRANULOCYTES NFR BLD AUTO: 0.7 % — SIGNIFICANT CHANGE UP (ref 0–0.9)
KETONES UR-MCNC: ABNORMAL MG/DL
LEUKOCYTE ESTERASE UR-ACNC: NEGATIVE — SIGNIFICANT CHANGE UP
LYMPHOCYTES # BLD AUTO: 1.42 K/UL — SIGNIFICANT CHANGE UP (ref 1–3.3)
LYMPHOCYTES # BLD AUTO: 25.5 % — SIGNIFICANT CHANGE UP (ref 13–44)
MCHC RBC-ENTMCNC: 28 PG — SIGNIFICANT CHANGE UP (ref 27–34)
MCHC RBC-ENTMCNC: 33.3 GM/DL — SIGNIFICANT CHANGE UP (ref 32–36)
MCV RBC AUTO: 84 FL — SIGNIFICANT CHANGE UP (ref 80–100)
MONOCYTES # BLD AUTO: 0.35 K/UL — SIGNIFICANT CHANGE UP (ref 0–0.9)
MONOCYTES NFR BLD AUTO: 6.3 % — SIGNIFICANT CHANGE UP (ref 2–14)
NEUTROPHILS # BLD AUTO: 3.68 K/UL — SIGNIFICANT CHANGE UP (ref 1.8–7.4)
NEUTROPHILS NFR BLD AUTO: 66.1 % — SIGNIFICANT CHANGE UP (ref 43–77)
NITRITE UR-MCNC: NEGATIVE — SIGNIFICANT CHANGE UP
PH UR: 6 — SIGNIFICANT CHANGE UP (ref 5–8)
PLATELET # BLD AUTO: 251 K/UL — SIGNIFICANT CHANGE UP (ref 150–400)
POTASSIUM SERPL-MCNC: 4 MMOL/L — SIGNIFICANT CHANGE UP (ref 3.5–5.3)
POTASSIUM SERPL-SCNC: 4 MMOL/L — SIGNIFICANT CHANGE UP (ref 3.5–5.3)
PROT SERPL-MCNC: 7 G/DL — SIGNIFICANT CHANGE UP (ref 6.6–8.7)
PROT UR-MCNC: SIGNIFICANT CHANGE UP MG/DL
RBC # BLD: 4.93 M/UL — SIGNIFICANT CHANGE UP (ref 3.8–5.2)
RBC # FLD: 11.9 % — SIGNIFICANT CHANGE UP (ref 10.3–14.5)
RBC CASTS # UR COMP ASSIST: 20 /HPF — HIGH (ref 0–4)
RSV RNA NPH QL NAA+NON-PROBE: SIGNIFICANT CHANGE UP
SARS-COV-2 RNA SPEC QL NAA+PROBE: SIGNIFICANT CHANGE UP
SODIUM SERPL-SCNC: 136 MMOL/L — SIGNIFICANT CHANGE UP (ref 135–145)
SP GR SPEC: >1.03 — HIGH (ref 1–1.03)
SQUAMOUS # UR AUTO: >36 /HPF — HIGH (ref 0–5)
UROBILINOGEN FLD QL: 1 MG/DL — SIGNIFICANT CHANGE UP (ref 0.2–1)
WBC # BLD: 5.57 K/UL — SIGNIFICANT CHANGE UP (ref 3.8–10.5)
WBC # FLD AUTO: 5.57 K/UL — SIGNIFICANT CHANGE UP (ref 3.8–10.5)
WBC UR QL: 0 /HPF — SIGNIFICANT CHANGE UP (ref 0–5)

## 2024-10-06 PROCEDURE — 84702 CHORIONIC GONADOTROPIN TEST: CPT

## 2024-10-06 PROCEDURE — 71045 X-RAY EXAM CHEST 1 VIEW: CPT | Mod: 26

## 2024-10-06 PROCEDURE — 71045 X-RAY EXAM CHEST 1 VIEW: CPT

## 2024-10-06 PROCEDURE — 87637 SARSCOV2&INF A&B&RSV AMP PRB: CPT

## 2024-10-06 PROCEDURE — 96374 THER/PROPH/DIAG INJ IV PUSH: CPT

## 2024-10-06 PROCEDURE — 80053 COMPREHEN METABOLIC PANEL: CPT

## 2024-10-06 PROCEDURE — 86140 C-REACTIVE PROTEIN: CPT

## 2024-10-06 PROCEDURE — 83605 ASSAY OF LACTIC ACID: CPT

## 2024-10-06 PROCEDURE — 96375 TX/PRO/DX INJ NEW DRUG ADDON: CPT

## 2024-10-06 PROCEDURE — 36415 COLL VENOUS BLD VENIPUNCTURE: CPT

## 2024-10-06 PROCEDURE — 99284 EMERGENCY DEPT VISIT MOD MDM: CPT | Mod: 25

## 2024-10-06 PROCEDURE — 99284 EMERGENCY DEPT VISIT MOD MDM: CPT

## 2024-10-06 PROCEDURE — 85025 COMPLETE CBC W/AUTO DIFF WBC: CPT

## 2024-10-06 PROCEDURE — 87040 BLOOD CULTURE FOR BACTERIA: CPT

## 2024-10-06 PROCEDURE — 81001 URINALYSIS AUTO W/SCOPE: CPT

## 2024-10-06 RX ORDER — CEFTRIAXONE SODIUM 1 G
1000 VIAL (EA) INJECTION ONCE
Refills: 0 | Status: COMPLETED | OUTPATIENT
Start: 2024-10-06 | End: 2024-10-06

## 2024-10-06 RX ORDER — ACETAMINOPHEN 325 MG
1000 TABLET ORAL ONCE
Refills: 0 | Status: COMPLETED | OUTPATIENT
Start: 2024-10-06 | End: 2024-10-06

## 2024-10-06 RX ORDER — CEPHALEXIN 500 MG
1 CAPSULE ORAL
Qty: 14 | Refills: 0
Start: 2024-10-06 | End: 2024-10-12

## 2024-10-06 RX ORDER — SODIUM CHLORIDE 0.9 % (FLUSH) 0.9 %
1000 SYRINGE (ML) INJECTION ONCE
Refills: 0 | Status: COMPLETED | OUTPATIENT
Start: 2024-10-06 | End: 2024-10-06

## 2024-10-06 RX ADMIN — Medication 1000 MILLIGRAM(S): at 21:09

## 2024-10-06 RX ADMIN — Medication 1000 MILLILITER(S): at 19:46

## 2024-10-06 RX ADMIN — Medication 400 MILLIGRAM(S): at 19:46

## 2024-10-06 NOTE — ED PROVIDER NOTE - OBJECTIVE STATEMENT
23 -year-old female with right foot bunionectomy 3 weeks ago presents with fever Tmax 100.3 since yesterday with diffuse joint aches.  Patient finished antibiotics 1 week ago.  Patient has been going for regular follow-ups with podiatry and has no drainage or discharge and gets wound care.  Patient denies pregnancy.  Patient denies any fever sore throat cough dysuria abdominal pain nausea vomiting or headache.

## 2024-10-06 NOTE — ED PROVIDER NOTE - PROGRESS NOTE DETAILS
I performed the initial face to face bedside interview with this patient regarding history of present illness, review of symptoms and past medical, social and family history.  I completed an independent physical examination.  I was the initial provider who evaluated this patient.  The history, review of symptoms and examination was documented by me.  I have signed out the follow up of any pending tests (i.e. labs, radiological studies) to the PA.  I have discussed the patient’s plan of care and disposition with the PA. Labs reviewed and grossly unremarkable. CRP mildly elevated 15. Lactate wnl.   UA with moderate blood  RSV/COVID: negative.  Blood Cx: sent/pending  CXR wet read: no acute findings.     Patient afebrile while in the ED.   Rx keflex for UTI and instructed to f/u with her podiatrist Dr. Rosado as scheduled in 3 days.   Strict ED return precautions given if any new or worsening symptoms.

## 2024-10-06 NOTE — ED PROVIDER NOTE - NSFOLLOWUPINSTRUCTIONS_ED_ALL_ED_FT
Please take all medications as prescribed  1)Follow up with your PCP in 1 week  2)Follow up with your podiatrist Dr. Rosado as scheduled in 3 days on Wednesday 10/9/2024  Return to the emergency room if you are experiencing any new or worsening symptoms    Fever    A fever is an increase in the body's temperature above 100.4°F (38°C) or higher. In adults and children older than three months, a brief mild or moderate fever generally has no long-term effect, and it usually does not require treatment. Many times, fevers are the result of viral infections, which are self-resolving.  However, certain symptoms or diagnostic tests may suggest a bacterial infection that may respond to antibiotics. Take medications as directed by your health care provider.    SEEK IMMEDIATE MEDICAL CARE IF YOU OR YOUR CHILD HAVE ANY OF THE FOLLOWING SYMPTOMS : shortness of breath, seizure, rash/stiff neck/headache, severe abdominal pain, persistent vomiting, any signs of dehydration, or if your child has a fever for over five (5) days.      Urinary Tract Infection    A urinary tract infection (UTI) is an infection of any part of the urinary tract, which includes the kidneys, ureters, bladder, and urethra. Risk factors include ignoring your need to urinate, wiping back to front if female, being an uncircumcised male, and having diabetes or a weak immune system. Symptoms include frequent urination, pain or burning with urination, foul smelling urine, cloudy urine, pain in the lower abdomen, blood in the urine, and fever. If you were prescribed an antibiotic medicine, take it as told by your health care provider. Do not stop taking the antibiotic even if you start to feel better.    SEEK IMMEDIATE MEDICAL CARE IF YOU HAVE ANY OF THE FOLLOWING SYMPTOMS: severe back or abdominal pain, fever, inability to keep fluids or medicine down, dizziness/lightheadedness, or a change in mental status.

## 2024-10-06 NOTE — ED ADULT NURSE NOTE - OBJECTIVE STATEMENT
Patient presents to ED with self reported fever. Patient had bunion surgery on right foot on the 19th. IV access established. Blood and urine sent to lab. Patient medicated.

## 2024-10-06 NOTE — ED ADULT TRIAGE NOTE - CHIEF COMPLAINT QUOTE
Pt with fever 100.3 this morning. Recent bunion surgery 2 weeks ago, Ambulatory with steady gait. No medications PTA

## 2024-10-06 NOTE — ED PROVIDER NOTE - PHYSICAL EXAMINATION
Bunionectomy wound appears well with sutures in place no drainage no redness.  The wound was examined by removing the bandage and estradiol bandage was again placed

## 2024-10-06 NOTE — ED ADULT NURSE NOTE - NSFALLUNIVINTERV_ED_ALL_ED
Bed/Stretcher in lowest position, wheels locked, appropriate side rails in place/Call bell, personal items and telephone in reach/Instruct patient to call for assistance before getting out of bed/chair/stretcher/Non-slip footwear applied when patient is off stretcher/[place] to call system/Physically safe environment - no spills, clutter or unnecessary equipment/Purposeful proactive rounding/Room/bathroom lighting operational, light cord in reach

## 2024-10-06 NOTE — ED PROVIDER NOTE - CLINICAL SUMMARY MEDICAL DECISION MAKING FREE TEXT BOX
Based on the physical exam of the wound the bunionectomy wound appears clean and is not infected.  But given patient had recent surgery there is always a suspicion of bacteremia plan to check labs will send 1 set of blood culture will do a UA UA and chest x-ray along with flu COVID panel.  If the labs are normal patient to be discharged and will call if blood culture has some growth.

## 2024-10-06 NOTE — ED PROVIDER NOTE - PATIENT PORTAL LINK FT
You can access the FollowMyHealth Patient Portal offered by [facility] by registering at the following website: [email or web address]. By joining [employer]’s FollowMyHealth portal, you will also be able to view your health information using other applications (apps) compatible with our system.

## 2024-10-09 DIAGNOSIS — N39.0 URINARY TRACT INFECTION, SITE NOT SPECIFIED: ICD-10-CM

## 2024-10-09 DIAGNOSIS — Z98.890 OTHER SPECIFIED POSTPROCEDURAL STATES: ICD-10-CM

## 2024-10-09 DIAGNOSIS — R50.9 FEVER, UNSPECIFIED: ICD-10-CM

## 2024-10-09 DIAGNOSIS — R79.82 ELEVATED C-REACTIVE PROTEIN (CRP): ICD-10-CM

## 2024-10-12 LAB
CULTURE RESULTS: SIGNIFICANT CHANGE UP
SPECIMEN SOURCE: SIGNIFICANT CHANGE UP

## 2024-12-16 ENCOUNTER — RX RENEWAL (OUTPATIENT)
Age: 24
End: 2024-12-16

## 2025-03-04 ENCOUNTER — APPOINTMENT (OUTPATIENT)
Dept: DERMATOLOGY | Facility: CLINIC | Age: 25
End: 2025-03-04
Payer: MEDICAID

## 2025-03-04 ENCOUNTER — NON-APPOINTMENT (OUTPATIENT)
Age: 25
End: 2025-03-04

## 2025-03-04 DIAGNOSIS — L74.510 PRIMARY FOCAL HYPERHIDROSIS, AXILLA: ICD-10-CM

## 2025-03-04 PROCEDURE — 99213 OFFICE O/P EST LOW 20 MIN: CPT
